# Patient Record
Sex: FEMALE | Race: WHITE | NOT HISPANIC OR LATINO | URBAN - METROPOLITAN AREA
[De-identification: names, ages, dates, MRNs, and addresses within clinical notes are randomized per-mention and may not be internally consistent; named-entity substitution may affect disease eponyms.]

---

## 2023-12-12 ENCOUNTER — TELEPHONE (OUTPATIENT)
Dept: PEDIATRICS | Facility: CLINIC | Age: 23
End: 2023-12-12
Payer: COMMERCIAL

## 2023-12-12 NOTE — TELEPHONE ENCOUNTER
Agree that Sanjana needs an appointment since long overdue and has not been prescribed OCP in 3 years. To make appointment with me or establish care where she now lives in [place].

## 2023-12-12 NOTE — TELEPHONE ENCOUNTER
Snajana has not has a WCC since 8/22. Advised that child make an appointment. She is interested in getting a script for birth control. Thanks

## 2023-12-29 ENCOUNTER — APPOINTMENT (OUTPATIENT)
Dept: PEDIATRICS | Facility: CLINIC | Age: 23
End: 2023-12-29
Payer: COMMERCIAL